# Patient Record
Sex: FEMALE | Race: OTHER | HISPANIC OR LATINO | URBAN - METROPOLITAN AREA
[De-identification: names, ages, dates, MRNs, and addresses within clinical notes are randomized per-mention and may not be internally consistent; named-entity substitution may affect disease eponyms.]

---

## 2021-10-29 ENCOUNTER — OUTPATIENT (OUTPATIENT)
Dept: OUTPATIENT SERVICES | Facility: HOSPITAL | Age: 66
LOS: 1 days | End: 2021-10-29
Payer: MEDICAID

## 2021-10-29 ENCOUNTER — APPOINTMENT (OUTPATIENT)
Dept: CT IMAGING | Facility: HOSPITAL | Age: 66
End: 2021-10-29

## 2021-10-29 PROBLEM — Z00.00 ENCOUNTER FOR PREVENTIVE HEALTH EXAMINATION: Status: ACTIVE | Noted: 2021-10-29

## 2021-10-29 PROCEDURE — 75574 CT ANGIO HRT W/3D IMAGE: CPT | Mod: 26

## 2021-10-29 PROCEDURE — 75574 CT ANGIO HRT W/3D IMAGE: CPT

## 2024-07-08 VITALS
HEIGHT: 59 IN | OXYGEN SATURATION: 100 % | RESPIRATION RATE: 15 BRPM | SYSTOLIC BLOOD PRESSURE: 182 MMHG | WEIGHT: 134.04 LBS | HEART RATE: 60 BPM | DIASTOLIC BLOOD PRESSURE: 81 MMHG

## 2024-07-08 RX ORDER — ASPIRIN 500 MG
81 TABLET ORAL DAILY
Refills: 0 | Status: DISCONTINUED | OUTPATIENT
Start: 2024-08-01 | End: 2024-08-02

## 2024-07-08 RX ORDER — CLOPIDOGREL BISULFATE 75 MG/1
75 TABLET, FILM COATED ORAL DAILY
Refills: 0 | Status: DISCONTINUED | OUTPATIENT
Start: 2024-08-01 | End: 2024-08-02

## 2024-07-08 RX ORDER — CHLORHEXIDINE GLUCONATE 500 MG/1
1 CLOTH TOPICAL ONCE
Refills: 0 | Status: DISCONTINUED | OUTPATIENT
Start: 2024-08-01 | End: 2024-08-02

## 2024-07-08 NOTE — H&P ADULT - NSICDXPASTMEDICALHX_GEN_ALL_CORE_FT
PAST MEDICAL HISTORY:  HLD (hyperlipidemia)     Hypertension     PAD (peripheral artery disease)

## 2024-07-08 NOTE — H&P ADULT - ASSESSMENT
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 García c/o b/l LE pain on claudication with associated weakness. In light of persistent tosin class III symptoms, risk factors and abnormal duplex US, pt referred for peripheral angiogram with possible PCI if clinically indicated.     - VSS; denies bleeding, GI bleeding, hematemesis, hematuria, BRBPR or melena  - ASA II, Mallampati II  - Sedation Type: Moderate  - Is Patient a good candidate for sedation: YES  - H 15/H 47, Plt 296 -- DAPT: ASA 81mg + Plavix 75mg (last dose 7/31/24) -- will be given pre-cath   - Cr 0.67; Euvolemic -- Fluids: NS 250cc bolus + 75cc x 2h   - NPO except sips with meds  - Interaction with patient and consent attained with aid of  ID #[id number]    Risks & benefits of procedure and alternative therapy have been explained to the patient including but not limited to: allergic reaction, bleeding w/possible need for blood transfusion, infection, renal and vascular compromise, limb damage, arrhythmia, stroke, vessel dissection/perforation, Myocardial infarction, emergent CABG. Informed consent obtained and in chart.

## 2024-07-08 NOTE — H&P ADULT - HISTORY OF PRESENT ILLNESS
Cardiologist: Dr. 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 Pt admits to associated bilateral leg/thigh/hip pain on exertion. Pt admits to bilateral weakness of lower extremities on mild exertion. In addition, admits to brief palpitations. Denies chest pain, syncope, n/v, dizziness, n/v, orthopnea, edema or other sx. Echo 3/27/24 revealing LVEF 65-70%, mild diastolic dysfunction present, trace MR. Stress echo 4/10/24 reveals normal stress echo test with normal LV wall motion response to exercise. Pt had lower extremity duplex arterial US 3/30/23 revealing common femoral, origin of deep femoral, superficial femoral, popliteal and posterior tibial arteries are patent with mild to moderate calcified plaques and no significant stenosis noted. In light of persistent tosin class III symptoms, risk factors and abnormal duplex US, pt referred for peripheral angiogram with possible PCI if clinically indicated. Cardiologist: Dr. 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 García c/o b/l LE pain on claudication with associated weakness. Otherwise, pt denies any fatigue, weakness, headache, dizziness/lightheadedness, CP, palpitations, SOB, REYNA, orthopnea/PND, LE edema, N/V, abdominal discomfort, melena, BRBPR, hematemesis, hematuria, or recent sick contacts/travel.    Pt endorses DAPT compliance of ASA 81mg + Plavix 75mg QD.     TTE (3/27/24): EF 65-70%, mild diastolic dysfunction, trace MR  Stress Echo (4/10/24): Normal   LE Arterial US (3/30/23): Deep femoral, superficial femora, popliteal, and posterior tibial arteries patent with mild-mod calcified plaques and no significant stenosis     In light of persistent tosin class III symptoms, risk factors and abnormal duplex US, pt referred for peripheral angiogram with possible PCI if clinically indicated.

## 2024-07-08 NOTE — H&P ADULT - NSHPLABSRESULTS_GEN_ALL_CORE
15.0   6.32  )-----------( 296      ( 01 Aug 2024 08:01 )             47.0       08-01    141  |  102  |  12  ----------------------------<  117<H>  4.4   |  30  |  0.67    Ca    10.1      01 Aug 2024 08:01  Mg     2.3     08-01    TPro  8.2  /  Alb  4.5  /  TBili  0.3  /  DBili  x   /  AST  22  /  ALT  19  /  AlkPhos  146<H>  08-01    PT/INR - ( 01 Aug 2024 08:01 )   PT: 10.3 sec;   INR: 0.90          PTT - ( 01 Aug 2024 08:01 )  PTT:33.2 sec

## 2024-08-01 ENCOUNTER — INPATIENT (INPATIENT)
Facility: HOSPITAL | Age: 69
LOS: 0 days | Discharge: HOME | DRG: 279 | End: 2024-08-02
Attending: STUDENT IN AN ORGANIZED HEALTH CARE EDUCATION/TRAINING PROGRAM | Admitting: STUDENT IN AN ORGANIZED HEALTH CARE EDUCATION/TRAINING PROGRAM
Payer: MEDICARE

## 2024-08-01 ENCOUNTER — TRANSCRIPTION ENCOUNTER (OUTPATIENT)
Age: 69
End: 2024-08-01

## 2024-08-01 LAB
A1C WITH ESTIMATED AVERAGE GLUCOSE RESULT: 5.7 % — HIGH (ref 4–5.6)
ALBUMIN SERPL ELPH-MCNC: 4.5 G/DL — SIGNIFICANT CHANGE UP (ref 3.3–5)
ALP SERPL-CCNC: 146 U/L — HIGH (ref 40–120)
ALT FLD-CCNC: 19 U/L — SIGNIFICANT CHANGE UP (ref 10–45)
ANION GAP SERPL CALC-SCNC: 9 MMOL/L — SIGNIFICANT CHANGE UP (ref 5–17)
APTT BLD: 33.2 SEC — SIGNIFICANT CHANGE UP (ref 24.5–35.6)
AST SERPL-CCNC: 22 U/L — SIGNIFICANT CHANGE UP (ref 10–40)
BASOPHILS # BLD AUTO: 0.05 K/UL — SIGNIFICANT CHANGE UP (ref 0–0.2)
BASOPHILS NFR BLD AUTO: 0.8 % — SIGNIFICANT CHANGE UP (ref 0–2)
BILIRUB SERPL-MCNC: 0.3 MG/DL — SIGNIFICANT CHANGE UP (ref 0.2–1.2)
BUN SERPL-MCNC: 12 MG/DL — SIGNIFICANT CHANGE UP (ref 7–23)
CALCIUM SERPL-MCNC: 10.1 MG/DL — SIGNIFICANT CHANGE UP (ref 8.4–10.5)
CHLORIDE SERPL-SCNC: 102 MMOL/L — SIGNIFICANT CHANGE UP (ref 96–108)
CHOLEST SERPL-MCNC: 200 MG/DL — HIGH
CK MB CFR SERPL CALC: 3.5 NG/ML — SIGNIFICANT CHANGE UP (ref 0–6.7)
CK SERPL-CCNC: 170 U/L — SIGNIFICANT CHANGE UP (ref 25–170)
CO2 SERPL-SCNC: 30 MMOL/L — SIGNIFICANT CHANGE UP (ref 22–31)
CREAT SERPL-MCNC: 0.67 MG/DL — SIGNIFICANT CHANGE UP (ref 0.5–1.3)
EGFR: 95 ML/MIN/1.73M2 — SIGNIFICANT CHANGE UP
EOSINOPHIL # BLD AUTO: 0.21 K/UL — SIGNIFICANT CHANGE UP (ref 0–0.5)
EOSINOPHIL NFR BLD AUTO: 3.3 % — SIGNIFICANT CHANGE UP (ref 0–6)
ESTIMATED AVERAGE GLUCOSE: 117 MG/DL — HIGH (ref 68–114)
GLUCOSE SERPL-MCNC: 117 MG/DL — HIGH (ref 70–99)
HCT VFR BLD CALC: 47 % — HIGH (ref 34.5–45)
HDLC SERPL-MCNC: 61 MG/DL — SIGNIFICANT CHANGE UP
HGB BLD-MCNC: 15 G/DL — SIGNIFICANT CHANGE UP (ref 11.5–15.5)
IMM GRANULOCYTES NFR BLD AUTO: 0.3 % — SIGNIFICANT CHANGE UP (ref 0–0.9)
INR BLD: 0.9 — SIGNIFICANT CHANGE UP (ref 0.85–1.18)
ISTAT ACTK (ACTIVATED CLOTTING TIME KAOLIN): 263 SEC — HIGH (ref 74–137)
ISTAT ACTK (ACTIVATED CLOTTING TIME KAOLIN): 305 SEC — HIGH (ref 74–137)
LIPID PNL WITH DIRECT LDL SERPL: 129 MG/DL — HIGH
LYMPHOCYTES # BLD AUTO: 2.16 K/UL — SIGNIFICANT CHANGE UP (ref 1–3.3)
LYMPHOCYTES # BLD AUTO: 34.2 % — SIGNIFICANT CHANGE UP (ref 13–44)
MAGNESIUM SERPL-MCNC: 2.3 MG/DL — SIGNIFICANT CHANGE UP (ref 1.6–2.6)
MCHC RBC-ENTMCNC: 30.7 PG — SIGNIFICANT CHANGE UP (ref 27–34)
MCHC RBC-ENTMCNC: 31.9 GM/DL — LOW (ref 32–36)
MCV RBC AUTO: 96.3 FL — SIGNIFICANT CHANGE UP (ref 80–100)
MONOCYTES # BLD AUTO: 0.58 K/UL — SIGNIFICANT CHANGE UP (ref 0–0.9)
MONOCYTES NFR BLD AUTO: 9.2 % — SIGNIFICANT CHANGE UP (ref 2–14)
NEUTROPHILS # BLD AUTO: 3.3 K/UL — SIGNIFICANT CHANGE UP (ref 1.8–7.4)
NEUTROPHILS NFR BLD AUTO: 52.2 % — SIGNIFICANT CHANGE UP (ref 43–77)
NON HDL CHOLESTEROL: 139 MG/DL — HIGH
NRBC # BLD: 0 /100 WBCS — SIGNIFICANT CHANGE UP (ref 0–0)
PLATELET # BLD AUTO: 296 K/UL — SIGNIFICANT CHANGE UP (ref 150–400)
POTASSIUM SERPL-MCNC: 4.4 MMOL/L — SIGNIFICANT CHANGE UP (ref 3.5–5.3)
POTASSIUM SERPL-SCNC: 4.4 MMOL/L — SIGNIFICANT CHANGE UP (ref 3.5–5.3)
PROT SERPL-MCNC: 8.2 G/DL — SIGNIFICANT CHANGE UP (ref 6–8.3)
PROTHROM AB SERPL-ACNC: 10.3 SEC — SIGNIFICANT CHANGE UP (ref 9.5–13)
RBC # BLD: 4.88 M/UL — SIGNIFICANT CHANGE UP (ref 3.8–5.2)
RBC # FLD: 14.1 % — SIGNIFICANT CHANGE UP (ref 10.3–14.5)
SODIUM SERPL-SCNC: 141 MMOL/L — SIGNIFICANT CHANGE UP (ref 135–145)
TRIGL SERPL-MCNC: 56 MG/DL — SIGNIFICANT CHANGE UP
WBC # BLD: 6.32 K/UL — SIGNIFICANT CHANGE UP (ref 3.8–10.5)
WBC # FLD AUTO: 6.32 K/UL — SIGNIFICANT CHANGE UP (ref 3.8–10.5)

## 2024-08-01 PROCEDURE — 37221: CPT | Mod: LT

## 2024-08-01 PROCEDURE — 75716 ARTERY X-RAYS ARMS/LEGS: CPT | Mod: 26,XU

## 2024-08-01 PROCEDURE — 37252 INTRVASC US NONCORONARY 1ST: CPT

## 2024-08-01 RX ORDER — AMLODIPINE BESYLATE 2.5 MG/1
1 TABLET ORAL
Refills: 0 | DISCHARGE

## 2024-08-01 RX ORDER — ATORVASTATIN CALCIUM 40 MG/1
40 TABLET, FILM COATED ORAL AT BEDTIME
Refills: 0 | Status: DISCONTINUED | OUTPATIENT
Start: 2024-08-01 | End: 2024-08-02

## 2024-08-01 RX ORDER — BACTERIOSTATIC SODIUM CHLORIDE 0.9 %
1000 VIAL (ML) INJECTION
Refills: 0 | Status: DISCONTINUED | OUTPATIENT
Start: 2024-08-01 | End: 2024-08-01

## 2024-08-01 RX ORDER — LOSARTAN/HYDROCHLOROTHIAZIDE 50-12.5 MG
1 TABLET ORAL
Refills: 0 | DISCHARGE

## 2024-08-01 RX ORDER — HYDROCHLOROTHIAZIDE 25 MG
1 TABLET ORAL
Refills: 0 | DISCHARGE

## 2024-08-01 RX ORDER — LOSARTAN POTASSIUM 50 MG/1
1 TABLET, FILM COATED ORAL
Refills: 0 | DISCHARGE

## 2024-08-01 RX ORDER — AMLODIPINE BESYLATE 2.5 MG/1
10 TABLET ORAL DAILY
Refills: 0 | Status: DISCONTINUED | OUTPATIENT
Start: 2024-08-02 | End: 2024-08-02

## 2024-08-01 RX ORDER — BACTERIOSTATIC SODIUM CHLORIDE 0.9 %
250 VIAL (ML) INJECTION ONCE
Refills: 0 | Status: COMPLETED | OUTPATIENT
Start: 2024-08-01 | End: 2024-08-01

## 2024-08-01 RX ORDER — LOSARTAN POTASSIUM 50 MG/1
100 TABLET, FILM COATED ORAL DAILY
Refills: 0 | Status: DISCONTINUED | OUTPATIENT
Start: 2024-08-02 | End: 2024-08-02

## 2024-08-01 RX ORDER — BACTERIOSTATIC SODIUM CHLORIDE 0.9 %
1000 VIAL (ML) INJECTION
Refills: 0 | Status: DISCONTINUED | OUTPATIENT
Start: 2024-08-01 | End: 2024-08-02

## 2024-08-01 RX ADMIN — Medication 180 MILLILITER(S): at 13:56

## 2024-08-01 RX ADMIN — CLOPIDOGREL BISULFATE 75 MILLIGRAM(S): 75 TABLET, FILM COATED ORAL at 09:39

## 2024-08-01 RX ADMIN — Medication 75 MILLILITER(S): at 09:39

## 2024-08-01 RX ADMIN — Medication 81 MILLIGRAM(S): at 09:39

## 2024-08-01 RX ADMIN — Medication 1000 MILLILITER(S): at 09:39

## 2024-08-01 RX ADMIN — ATORVASTATIN CALCIUM 40 MILLIGRAM(S): 40 TABLET, FILM COATED ORAL at 21:44

## 2024-08-01 NOTE — DISCHARGE NOTE PROVIDER - NSDCCPCAREPLAN_GEN_ALL_CORE_FT
PRINCIPAL DISCHARGE DIAGNOSIS  Diagnosis: PAD (peripheral artery disease)  Assessment and Plan of Treatment: You have a known history of peripheral artery disease (PAD), which is a condition of the blood vessels that supply the legs and feet. It occurs due to narrowing of the arteries in the legs. This causes decreased blood flow, which can injure nerves and other tissues.   You underwent a cardiac catheterization on 8/1/24 and received a stent to the L external iliac artery. PLEASE CONTINUE ASPIRIN 81MG DAILY AND PLAVIX 75MG DAILY. DO NOT STOP THESE MEDICATIONS FOR ANY REASON AS THEY ARE KEEPING YOUR STENT OPEN AND PREVENTING A HEART ATTACK. Aspirin and Plavix can put you at increased risk of bleeding; please avoid NSAIDS (such as Motrin, Advil, Ibuprofen, Naproxen, or Aleve, as these medications may further your risk of bleeding.   Avoid strenuous activity or heavy lifting anything more than 5lbs for the next five days. Do not take a bath or swim for the next five days; you may shower. For any bleeding or hematoma formation (hardened blood collection under the skin) at the access site of your L groin, please hold pressure and go to the emergency room.   SEEK IMMEDIATE MEDICAL CARE IF YOU HAVE ANY OF THE FOLLOWING SYMPTOMS: worsening chest pain, racing heart beat, unexplained jaw/neck/back pain, severe abdominal pain, shortness of breath, dizziness or lightheadedness, fainting, sweaty or clammy skin, vomiting, or coughing up blood. These symptoms may represent a serious problem that is an emergency. Do not wait to see if the symptoms will go away. Get medical help right away. Call 911 and do not drive yourself to the hospital. Please follow up with Dr. García in 1-2 weeks.      SECONDARY DISCHARGE DIAGNOSES  Diagnosis: HTN (hypertension)  Assessment and Plan of Treatment: Hypertension, commonly called high blood pressure, is when the force of blood pumping through your arteries is too strong. Hypertension forces your heart to work harder to pump blood. Your arteries may become narrow or stiff. Having untreated or uncontrolled hypertension for a long period of time can cause heart attack, stroke, kidney disease, and other problems.  Please continue amlodipine 10 mg once a day and losartan-hydrochlorothiazide 100 mg-25 mg once a day as listed to keep your blood pressure controlled. For blood pressure that is too high or too low please see your doctor or go to the emergency room as necessary. Please continue to follow up with your cardiologist or primary care physician in order to discuss lifestyle modifications or the initiation of additional blood pressure lowering medications.    Diagnosis: HLD (hyperlipidemia)  Assessment and Plan of Treatment: Your LDL (unhealthy cholesterol) was 129 on this admission, and the goal is LDL at least 70 for the prevention of future heart disease-related events. Please INCREASE to ____ at bedtime to keep your cholesterol low. High cholesterol contributes to heart disease. Please continue to follow up with Dr. García and discuss the initiation of additional cholesterol-lowering agents in the future as necessary.     PRINCIPAL DISCHARGE DIAGNOSIS  Diagnosis: PAD (peripheral artery disease)  Assessment and Plan of Treatment: You have a known history of peripheral artery disease (PAD), which is a condition of the blood vessels that supply the legs and feet. It occurs due to narrowing of the arteries in the legs. This causes decreased blood flow, which can injure nerves and other tissues.   You underwent a cardiac catheterization on 8/1/24 and received a stent to the L external iliac artery. PLEASE CONTINUE ASPIRIN 81MG DAILY AND PLAVIX 75MG DAILY. DO NOT STOP THESE MEDICATIONS FOR ANY REASON AS THEY ARE KEEPING YOUR STENT OPEN AND PREVENTING A HEART ATTACK. Aspirin and Plavix can put you at increased risk of bleeding; please avoid NSAIDS (such as Motrin, Advil, Ibuprofen, Naproxen, or Aleve, as these medications may further your risk of bleeding.   Avoid strenuous activity or heavy lifting anything more than 5lbs for the next five days. Do not take a bath or swim for the next five days; you may shower. For any bleeding or hematoma formation (hardened blood collection under the skin) at the access site of your L groin, please hold pressure and go to the emergency room.   SEEK IMMEDIATE MEDICAL CARE IF YOU HAVE ANY OF THE FOLLOWING SYMPTOMS: worsening chest pain, racing heart beat, unexplained jaw/neck/back pain, severe abdominal pain, shortness of breath, dizziness or lightheadedness, fainting, sweaty or clammy skin, vomiting, or coughing up blood. These symptoms may represent a serious problem that is an emergency. Do not wait to see if the symptoms will go away. Get medical help right away. Call 911 and do not drive yourself to the hospital. Please follow up with Dr. García in 1-2 weeks.      SECONDARY DISCHARGE DIAGNOSES  Diagnosis: HTN (hypertension)  Assessment and Plan of Treatment: Hypertension, commonly called high blood pressure, is when the force of blood pumping through your arteries is too strong. Hypertension forces your heart to work harder to pump blood. Your arteries may become narrow or stiff. Having untreated or uncontrolled hypertension for a long period of time can cause heart attack, stroke, kidney disease, and other problems.  Please continue amlodipine 10 mg once a day and losartan-hydrochlorothiazide 100 mg-25 mg once a day as listed to keep your blood pressure controlled. For blood pressure that is too high or too low please see your doctor or go to the emergency room as necessary. Please continue to follow up with your cardiologist or primary care physician in order to discuss lifestyle modifications or the initiation of additional blood pressure lowering medications.    Diagnosis: HLD (hyperlipidemia)  Assessment and Plan of Treatment: Your LDL (unhealthy cholesterol) was 129 on this admission, and the goal is LDL at least 70 for the prevention of future heart disease-related events. Please INCREASE to Atorvastatin 80mg once daily at bedtime to keep your cholesterol low. High cholesterol contributes to heart disease. Please continue to follow up with Dr. García and discuss the initiation of additional cholesterol-lowering agents in the future as necessary.

## 2024-08-01 NOTE — DISCHARGE NOTE PROVIDER - ATTENDING DISCHARGE PHYSICAL EXAMINATION:
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     Exam:  NAD  Lungs CTA  Cardiac S1, S2 no murmurs  Extremities- LFA access site stable, no hematomas, no mass, pulse intact    PAD- Continue DAPT, high intensity statin  HTN- Continue AMlodipine, losartan and HCTZ  HLD- Sstatin

## 2024-08-01 NOTE — DISCHARGE NOTE PROVIDER - HOSPITAL COURSE
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 García c/o b/l LE pain on claudication with associated weakness. In light of persistent tosin class III symptoms, risk factors and abnormal duplex US, pt referred for peripheral angiogram with possible PCI if clinically indicated.     Past Testing:  - TTE (3/27/24): EF 65-70%, mild diastolic dysfunction, trace MR  - Stress Echo (4/10/24): Normal   - LE Arterial US (3/30/23): Deep femoral, superficial femora, popliteal, and posterior tibial arteries patent with mild-mod calcified plaques and no significant stenosis    Pt now s/p peripheral angiogram (8/1/24): PTA/shockwave/JEIMY L ext iliac, residual L common iliac 70% (med management for now), CI: ostial 50-60%, LFA 7Fr sheath. Removed and manual pressure held ~ 21 minutes.    Pt admitted to cardiac tele for further monitoring overnight. Pt seen and examined at bedside with no acute complaints or events overnight. VSS. Physical exam unremarkable. Left groin access site stable - no hematoma, no bruit, c/d/i, DP/PT pulses intact to baseline. Labs and telemetry reviewed. Pt stable for discharge as discussed with Dr. Whitney. Pt advised to f/u with Dr. García in 1-2 weeks.     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 García c/o b/l LE pain on claudication with associated weakness. In light of persistent tosin class III symptoms, risk factors and abnormal duplex US, pt referred for peripheral angiogram with possible PCI if clinically indicated.     Past Testing:  - TTE (3/27/24): EF 65-70%, mild diastolic dysfunction, trace MR  - Stress Echo (4/10/24): Normal   - LE Arterial US (3/30/23): Deep femoral, superficial femora, popliteal, and posterior tibial arteries patent with mild-mod calcified plaques and no significant stenosis    Pt now s/p peripheral angiogram (8/1/24): PTA/shockwave/JEIMY L ext iliac, residual L common iliac 70% (med management for now), CI: ostial 50-60%, LFA 7Fr sheath. Removed and manual pressure held ~ 21 minutes.    Pt admitted to cardiac tele for further monitoring overnight. Pt seen and examined at bedside with no acute complaints or events overnight. VSS. Physical exam unremarkable. Left groin access site stable - no hematoma, no bruit, c/d/i, DP/PT pulses intact to baseline. Labs and telemetry reviewed. Pt stable for discharge as discussed with Dr. Whitney. Pt advised to f/u with Dr. García in 1-2 weeks.     Discharge meds:  - DAPT: ASA 81 mg PO qd/Plavix 75 mg PO qd  - Atorvastatin 80 mg PO qhs  - Amlodipine 10 mg PO qd  - Losartan-HCTZ 100 mg-25 mg PO qd

## 2024-08-01 NOTE — PROGRESS NOTE ADULT - SUBJECTIVE AND OBJECTIVE BOX
Interventional Cardiology [place] Sheath Pull Note    Pt without complaints. VSS    Pre-Sheath Removal:    [X] Left          [X] 7Fr        [X] Arterial    [NO] Hematoma        [NO] Bleed    Pulses:    [X] Right     [X] Left       DP: [X] 2+     Hemostasis Achieved with: 21 minutes manual pressure    Vasovagal Reaction: No    Meds Given: None    Post-Sheath Removal:  [X] Left          [X] Groin        [NO] Hematoma        [NO] Bleed            [NO] Bruit    Pulses:  [X] Left       DP: [X] 2+     A/P: s/p PTA/shockwave/JEIMY L ext iliac, residual L common iliac 70% (med management for now), CI: ostial 50-60%, LFA 7Fr    -Continue bedrest for 6 hours post-sheath removal (pt given instructions)  -Continue to monitor

## 2024-08-01 NOTE — PATIENT PROFILE ADULT - FALL HARM RISK - HARM RISK INTERVENTIONS
Assistance with ambulation/Assistance OOB with selected safe patient handling equipment/Communicate Risk of Fall with Harm to all staff/Discuss with provider need for PT consult/Monitor gait and stability/Provide patient with walking aids - walker, cane, crutches/Reinforce activity limits and safety measures with patient and family/Sit up slowly, dangle for a short time, stand at bedside before walking/Tailored Fall Risk Interventions/Use of alarms - bed, chair and/or voice tab/Visual Cue: Yellow wristband and red socks/Bed in lowest position, wheels locked, appropriate side rails in place/Call bell, personal items and telephone in reach/Instruct patient to call for assistance before getting out of bed or chair/Non-slip footwear when patient is out of bed/[place] to call system/Physically safe environment - no spills, clutter or unnecessary equipment/Purposeful Proactive Rounding/Room/bathroom lighting operational, light cord in reach

## 2024-08-01 NOTE — DISCHARGE NOTE PROVIDER - CARE PROVIDER_API CALL
Jhony Lyn  Interventional Cardiology  [address] [place] [id number]  Phone: [phone]  Fax: [phone]  Established Patient  Follow Up Time: 1 week

## 2024-08-01 NOTE — DISCHARGE NOTE PROVIDER - NSDCMRMEDTOKEN_GEN_ALL_CORE_FT
amLODIPine 10 mg oral tablet: 1 tab(s) orally once a day  aspirin 81 mg oral capsule: 1 cap(s) orally once a day  atorvastatin 40 mg oral tablet: 1 tab(s) orally once a day  losartan-hydroCHLOROthiazide 100 mg-25 mg oral tablet: 1 tab(s) orally once a day  Plavix 75 mg oral tablet: 1 tab(s) orally once a day   amLODIPine 10 mg oral tablet: 1 tab(s) orally once a day  Aspirin EC 81 mg oral delayed release tablet: 1 tab(s) orally once a day  atorvastatin 80 mg oral tablet: 1 tab(s) orally once a day  clopidogrel 75 mg oral tablet: 1 tab(s) orally once a day  losartan-hydroCHLOROthiazide 100 mg-25 mg oral tablet: 1 tab(s) orally once a day  pantoprazole 40 mg oral delayed release tablet: 1 tab(s) orally once a day

## 2024-08-02 ENCOUNTER — TRANSCRIPTION ENCOUNTER (OUTPATIENT)
Age: 69
End: 2024-08-02

## 2024-08-02 VITALS
RESPIRATION RATE: 18 BRPM | DIASTOLIC BLOOD PRESSURE: 60 MMHG | HEART RATE: 61 BPM | SYSTOLIC BLOOD PRESSURE: 126 MMHG | OXYGEN SATURATION: 95 % | TEMPERATURE: 98 F

## 2024-08-02 LAB
ALBUMIN SERPL ELPH-MCNC: 3.6 G/DL — SIGNIFICANT CHANGE UP (ref 3.3–5)
ALP SERPL-CCNC: 120 U/L — SIGNIFICANT CHANGE UP (ref 40–120)
ALT FLD-CCNC: 16 U/L — SIGNIFICANT CHANGE UP (ref 10–45)
ANION GAP SERPL CALC-SCNC: 10 MMOL/L — SIGNIFICANT CHANGE UP (ref 5–17)
AST SERPL-CCNC: 18 U/L — SIGNIFICANT CHANGE UP (ref 10–40)
BASOPHILS # BLD AUTO: 0.04 K/UL — SIGNIFICANT CHANGE UP (ref 0–0.2)
BASOPHILS NFR BLD AUTO: 0.7 % — SIGNIFICANT CHANGE UP (ref 0–2)
BILIRUB SERPL-MCNC: 0.5 MG/DL — SIGNIFICANT CHANGE UP (ref 0.2–1.2)
BUN SERPL-MCNC: 12 MG/DL — SIGNIFICANT CHANGE UP (ref 7–23)
CALCIUM SERPL-MCNC: 9.4 MG/DL — SIGNIFICANT CHANGE UP (ref 8.4–10.5)
CHLORIDE SERPL-SCNC: 101 MMOL/L — SIGNIFICANT CHANGE UP (ref 96–108)
CO2 SERPL-SCNC: 25 MMOL/L — SIGNIFICANT CHANGE UP (ref 22–31)
CREAT SERPL-MCNC: 0.63 MG/DL — SIGNIFICANT CHANGE UP (ref 0.5–1.3)
EGFR: 96 ML/MIN/1.73M2 — SIGNIFICANT CHANGE UP
EOSINOPHIL # BLD AUTO: 0.22 K/UL — SIGNIFICANT CHANGE UP (ref 0–0.5)
EOSINOPHIL NFR BLD AUTO: 3.7 % — SIGNIFICANT CHANGE UP (ref 0–6)
GLUCOSE BLDC GLUCOMTR-MCNC: 104 MG/DL — HIGH (ref 70–99)
GLUCOSE SERPL-MCNC: 94 MG/DL — SIGNIFICANT CHANGE UP (ref 70–99)
HCT VFR BLD CALC: 44.7 % — SIGNIFICANT CHANGE UP (ref 34.5–45)
HGB BLD-MCNC: 14.4 G/DL — SIGNIFICANT CHANGE UP (ref 11.5–15.5)
IMM GRANULOCYTES NFR BLD AUTO: 0.3 % — SIGNIFICANT CHANGE UP (ref 0–0.9)
LYMPHOCYTES # BLD AUTO: 1.43 K/UL — SIGNIFICANT CHANGE UP (ref 1–3.3)
LYMPHOCYTES # BLD AUTO: 24.2 % — SIGNIFICANT CHANGE UP (ref 13–44)
MAGNESIUM SERPL-MCNC: 2.1 MG/DL — SIGNIFICANT CHANGE UP (ref 1.6–2.6)
MCHC RBC-ENTMCNC: 31.5 PG — SIGNIFICANT CHANGE UP (ref 27–34)
MCHC RBC-ENTMCNC: 32.2 GM/DL — SIGNIFICANT CHANGE UP (ref 32–36)
MCV RBC AUTO: 97.8 FL — SIGNIFICANT CHANGE UP (ref 80–100)
MONOCYTES # BLD AUTO: 0.52 K/UL — SIGNIFICANT CHANGE UP (ref 0–0.9)
MONOCYTES NFR BLD AUTO: 8.8 % — SIGNIFICANT CHANGE UP (ref 2–14)
NEUTROPHILS # BLD AUTO: 3.67 K/UL — SIGNIFICANT CHANGE UP (ref 1.8–7.4)
NEUTROPHILS NFR BLD AUTO: 62.3 % — SIGNIFICANT CHANGE UP (ref 43–77)
NRBC # BLD: 0 /100 WBCS — SIGNIFICANT CHANGE UP (ref 0–0)
PLATELET # BLD AUTO: 237 K/UL — SIGNIFICANT CHANGE UP (ref 150–400)
POTASSIUM SERPL-MCNC: 3.8 MMOL/L — SIGNIFICANT CHANGE UP (ref 3.5–5.3)
POTASSIUM SERPL-SCNC: 3.8 MMOL/L — SIGNIFICANT CHANGE UP (ref 3.5–5.3)
PROT SERPL-MCNC: 6.7 G/DL — SIGNIFICANT CHANGE UP (ref 6–8.3)
RBC # BLD: 4.57 M/UL — SIGNIFICANT CHANGE UP (ref 3.8–5.2)
RBC # FLD: 14.2 % — SIGNIFICANT CHANGE UP (ref 10.3–14.5)
SODIUM SERPL-SCNC: 136 MMOL/L — SIGNIFICANT CHANGE UP (ref 135–145)
WBC # BLD: 5.9 K/UL — SIGNIFICANT CHANGE UP (ref 3.8–10.5)
WBC # FLD AUTO: 5.9 K/UL — SIGNIFICANT CHANGE UP (ref 3.8–10.5)

## 2024-08-02 PROCEDURE — 37799 UNLISTED PX VASCULAR SURGERY: CPT

## 2024-08-02 PROCEDURE — C1769: CPT

## 2024-08-02 PROCEDURE — C1725: CPT

## 2024-08-02 PROCEDURE — 37252 INTRVASC US NONCORONARY 1ST: CPT

## 2024-08-02 PROCEDURE — C1887: CPT

## 2024-08-02 PROCEDURE — 85025 COMPLETE CBC W/AUTO DIFF WBC: CPT

## 2024-08-02 PROCEDURE — 82553 CREATINE MB FRACTION: CPT

## 2024-08-02 PROCEDURE — 36415 COLL VENOUS BLD VENIPUNCTURE: CPT

## 2024-08-02 PROCEDURE — 85730 THROMBOPLASTIN TIME PARTIAL: CPT

## 2024-08-02 PROCEDURE — 93005 ELECTROCARDIOGRAM TRACING: CPT

## 2024-08-02 PROCEDURE — 83735 ASSAY OF MAGNESIUM: CPT

## 2024-08-02 PROCEDURE — 93010 ELECTROCARDIOGRAM REPORT: CPT

## 2024-08-02 PROCEDURE — 85347 COAGULATION TIME ACTIVATED: CPT

## 2024-08-02 PROCEDURE — 80061 LIPID PANEL: CPT

## 2024-08-02 PROCEDURE — 37221: CPT

## 2024-08-02 PROCEDURE — C1894: CPT

## 2024-08-02 PROCEDURE — 82550 ASSAY OF CK (CPK): CPT

## 2024-08-02 PROCEDURE — C1753: CPT

## 2024-08-02 PROCEDURE — C1876: CPT

## 2024-08-02 PROCEDURE — 82962 GLUCOSE BLOOD TEST: CPT

## 2024-08-02 PROCEDURE — 75630 X-RAY AORTA LEG ARTERIES: CPT

## 2024-08-02 PROCEDURE — 83036 HEMOGLOBIN GLYCOSYLATED A1C: CPT

## 2024-08-02 PROCEDURE — 85610 PROTHROMBIN TIME: CPT

## 2024-08-02 PROCEDURE — 99239 HOSP IP/OBS DSCHRG MGMT >30: CPT

## 2024-08-02 PROCEDURE — 80053 COMPREHEN METABOLIC PANEL: CPT

## 2024-08-02 RX ORDER — POTASSIUM CHLORIDE 1500 MG/1
20 TABLET, EXTENDED RELEASE ORAL ONCE
Refills: 0 | Status: COMPLETED | OUTPATIENT
Start: 2024-08-02 | End: 2024-08-02

## 2024-08-02 RX ORDER — ASPIRIN 500 MG
1 TABLET ORAL
Qty: 30 | Refills: 11
Start: 2024-08-02 | End: 2025-07-27

## 2024-08-02 RX ORDER — CLOPIDOGREL BISULFATE 75 MG/1
1 TABLET, FILM COATED ORAL
Qty: 30 | Refills: 11
Start: 2024-08-02 | End: 2025-07-27

## 2024-08-02 RX ORDER — ATORVASTATIN CALCIUM 40 MG/1
1 TABLET, FILM COATED ORAL
Refills: 0 | DISCHARGE

## 2024-08-02 RX ORDER — CLOPIDOGREL BISULFATE 75 MG/1
1 TABLET, FILM COATED ORAL
Refills: 0 | DISCHARGE

## 2024-08-02 RX ORDER — PANTOPRAZOLE SODIUM 20 MG/1
1 TABLET, DELAYED RELEASE ORAL
Qty: 30 | Refills: 11
Start: 2024-08-02 | End: 2025-07-27

## 2024-08-02 RX ORDER — ATORVASTATIN CALCIUM 40 MG/1
1 TABLET, FILM COATED ORAL
Qty: 30 | Refills: 2
Start: 2024-08-02 | End: 2024-10-30

## 2024-08-02 RX ORDER — ASPIRIN 500 MG
1 TABLET ORAL
Refills: 0 | DISCHARGE

## 2024-08-02 RX ADMIN — POTASSIUM CHLORIDE 20 MILLIEQUIVALENT(S): 1500 TABLET, EXTENDED RELEASE ORAL at 12:20

## 2024-08-02 RX ADMIN — CLOPIDOGREL BISULFATE 75 MILLIGRAM(S): 75 TABLET, FILM COATED ORAL at 10:15

## 2024-08-02 RX ADMIN — LOSARTAN POTASSIUM 100 MILLIGRAM(S): 50 TABLET, FILM COATED ORAL at 06:12

## 2024-08-02 RX ADMIN — Medication 81 MILLIGRAM(S): at 10:15

## 2024-08-02 RX ADMIN — AMLODIPINE BESYLATE 10 MILLIGRAM(S): 2.5 TABLET ORAL at 06:12

## 2024-08-02 NOTE — DISCHARGE NOTE NURSING/CASE MANAGEMENT/SOCIAL WORK - PATIENT PORTAL LINK FT
You can access the FollowMyHealth Patient Portal offered by [facility] by registering at the following website: [email or web address]. By joining [facility]’s FollowMyHealth portal, you will also be able to view your health information using other applications (apps) compatible with our system.

## 2024-08-14 DIAGNOSIS — I73.9 PERIPHERAL VASCULAR DISEASE, UNSPECIFIED: ICD-10-CM

## 2024-08-14 DIAGNOSIS — I10 ESSENTIAL (PRIMARY) HYPERTENSION: ICD-10-CM

## 2024-08-14 DIAGNOSIS — I71.40 ABDOMINAL AORTIC ANEURYSM, WITHOUT RUPTURE, UNSPECIFIED: ICD-10-CM

## 2024-08-14 DIAGNOSIS — I87.2 VENOUS INSUFFICIENCY (CHRONIC) (PERIPHERAL): ICD-10-CM

## 2024-08-14 DIAGNOSIS — E78.5 HYPERLIPIDEMIA, UNSPECIFIED: ICD-10-CM

## 2024-08-14 DIAGNOSIS — Z87.891 PERSONAL HISTORY OF NICOTINE DEPENDENCE: ICD-10-CM

## 2024-08-14 DIAGNOSIS — Z79.82 LONG TERM (CURRENT) USE OF ASPIRIN: ICD-10-CM

## 2024-08-14 DIAGNOSIS — Z88.0 ALLERGY STATUS TO PENICILLIN: ICD-10-CM

## 2024-08-14 DIAGNOSIS — Z79.02 LONG TERM (CURRENT) USE OF ANTITHROMBOTICS/ANTIPLATELETS: ICD-10-CM
